# Patient Record
(demographics unavailable — no encounter records)

---

## 2025-04-11 NOTE — DEVELOPMENTAL MILESTONES

## 2025-04-11 NOTE — DISCUSSION/SUMMARY
[Normal Growth] : growth [Normal Development] : development [None] : No known medical problems [No Elimination Concerns] : elimination [No Feeding Concerns] : feeding [No Skin Concerns] : skin [Normal Sleep Pattern] : sleep [Family Support] : family support [Encouraging Literacy Activities] : encouraging literacy activities [Playing with Peers] : playing with peers [Promoting Physical Activity] : promoting physical activity [Safety] : safety [No Medications] : ~He/She~ is not on any medications [Parent/Guardian] : parent/guardian [] : The components of the vaccine(s) to be administered today are listed in the plan of care. The disease(s) for which the vaccine(s) are intended to prevent and the risks have been discussed with the caretaker.  The risks are also included in the appropriate vaccination information statements which have been provided to the patient's caregiver.  The caregiver has given consent to vaccinate. [FreeTextEntry1] : well 3 yr old male labs as ordered return in 1 yr/prn

## 2025-04-11 NOTE — HISTORY OF PRESENT ILLNESS
[Mother] : mother [2% ___ oz/d] : consumes [unfilled] oz of 2% cow's milk per day [Fruit] : fruit [Vegetables] : vegetables [Meat] : meat [Grains] : grains [Eggs] : eggs [Fish] : fish [Dairy] : dairy [Vitamin] : Patient takes vitamin daily [Normal] : Normal [In bed] : In bed [Sippy cup use] : Sippy cup use [Brushing teeth] : Brushing teeth [Yes] : Patient goes to dentist yearly [Toothpaste] : Primary Fluoride Source: Toothpaste [In nursery school] : In nursery school [Playtime (60 min/d)] : Playtime 60 min a day [< 2 hrs of screen time] : Less than 2 hrs of screen time [Appropiate parent-child communication] : Appropriate parent-child communication [Child given choices] : Child given choices [Parent has appropriate responses to behavior] : Parent has appropriate responses to behavior [No] : Not at  exposure [Water heater temperature set at <120 degrees F] : Water heater temperature set at <120 degrees F [Car seat in back seat] : Car seat in back seat [Smoke Detectors] : Smoke detectors [Supervised play near cars and streets] : Supervised play near cars and streets [Carbon Monoxide Detectors] : Carbon monoxide detectors [Exposure to electronic nicotine delivery system] : No exposure to electronic nicotine delivery system [Up to date] : Up to date [de-identified] : none [NO] : No

## 2025-04-11 NOTE — PHYSICAL EXAM

## 2025-06-24 NOTE — DEVELOPMENTAL MILESTONES
[Pull Self to Stand ___ Months] : Pull self to stand: [unfilled] months [Walk ___ Months] : Walk: [unfilled] months [Too Young] : too young

## 2025-06-25 NOTE — DATA REVIEWED
[de-identified] : Left foot AP/lateral/oblique x-rays ordered, done and independently reviewed today on 6/24/25: The lateral cuneiform bone is present.

## 2025-06-25 NOTE — END OF VISIT
[FreeTextEntry3] : I, Clement Beckett MD, I personally performed the services described in the documentation, reviewed the documentation recorded by the scribe in my presence and it accurately and completely records my words and actions

## 2025-06-25 NOTE — PHYSICAL EXAM
[Normal] : Patient is awake and alert and in no acute distress [Conjunctiva] : normal conjunctiva [Eyelids] : normal eyelids [Pupils] : pupils were equal and round [Ears] : normal ears [Nose] : normal nose [Lips] : normal lips [Rash] : no rash [FreeTextEntry1] : Pleasant and cooperative with exam, appropriate for age. Ambulates with A left intoeing gait with dynamic supination and positive metatarsus adductus noted.  Awake and alert appropriate for their age.  Skin: No rashes noted.  Eyes: Both conjunctiva, eyelids and pupils are present.  ENT:  Both ears, nose and lips are present. No nasal congestion.  Resp: No cough or wheezing noted.  Left foot/ankle: Positive mild flexible residual metatarsus adductus noted.  No skin breakdown or sores.  Neurologically intact with full sensation to palpation.  The foot has the ability to reduce to neutral position.  Achilles dorsiflexion to 15 degrees above neutral.  Ankle joint is stable with stress maneuvers.  There is no fixed supination noted but when walk and stand his ankle/foot is in supination. .5/5 muscle strength noted. 2+ pulses palpated in the extremity. Capillary refill less than 2 seconds in all digits.  When he walks, he is walking on the lateral border of the foot with dynamic supination

## 2025-06-25 NOTE — DATA REVIEWED
[de-identified] : Left foot AP/lateral/oblique x-rays ordered, done and independently reviewed today on 6/24/25: The lateral cuneiform bone is present.

## 2025-06-25 NOTE — HISTORY OF PRESENT ILLNESS
[FreeTextEntry1] : Nate is a 3.6 years old  male  with  left clubfoot  foot. 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 He was born at 38 weeks via vaginal delivery and was not breech. Presented for initial clubfoot evaluation and treatment. At the end of the visit, he was initiated into serial casting and  Later, we placed him in Wei bar and shoes for 23 hours/d.  patient met his gross motor  milestones, Today Nate,  Per mom is  not really wearing the brace during sleep and naps, he wakes up and removed them . He presents today for follow up. When he stand and walk he is on the lateral border of the foot, same if he is inside regular shoes.  Please refer to last note from previous treatment and further details.   Today, Nate is a healthy 3-year-old boy who has a history of left clubfoot.  He has not used any bracing for over a year she believes she was not supposed get new braces.  As per the last note in March 2024 she was instructed to get custom AFOs.  As per the mother his foot still looks the same.  He does not do any type of physical therapy.  He presents today for pediatric orthopedic follow-up examination.

## 2025-06-25 NOTE — ASSESSMENT
[FreeTextEntry1] : Nate is a 3.5 -old boy who has a history of a left clubfoot which also had a recurrence due to noncompliance with his Wei bars.  Today's assessment was performed with the assistance of the patient's parent as an independent historian as the patient's history is unreliable.  The radiographs obtained today were reviewed with both the parent and patient confirming the lateral cuneiform bone is present. The recommendation of the cell consist of going forward with surgical intervention , a left tibial anterior tendon transfer and possible a plantar fascial release.  Our surgical  will contact the family in regard to a surgical date and presurgical testing.  We had a thorough talk in regard to the diagnosis, prognosis and treatment modalities.  All questions and concerns were addressed today. There was a verbal understanding from the parents and patient.   JERONIMO Jha have acted as a scribe and documented the above information for Dr. Beckett.   This note was generated using Dragon medical dictation software. A reasonable effort has been made for proofreading its contents; however, typos may still remain. If there are any questions or points of clarification needed, please do not hesitate to contact my office.   The above documentation completed by the scribe is an accurate record of both my words and actions.   Dr. Beckett.

## 2025-06-25 NOTE — REVIEW OF SYSTEMS
[No Acute Changes] : No acute changes since previous visit [Change in Activity] : no change in activity [Fever Above 102] : no fever [Malaise] : no malaise [Rash] : no rash [Redness] : no redness [Nasal Stuffiness] : no nasal congestion [Sore Throat] : no sore throat [Heart Problems] : no heart problems [Tachypnea] : no tachypnea [Wheezing] : no wheezing [Cough] : no cough [Change in Appetite] : no change in appetite [Joint Pains] : no arthralgias [Joint Swelling] : no joint swelling [Back Pain] : ~T no back pain [Muscle Aches] : no muscle aches [Seizure] : no seizures [Sleep Disturbances] : ~T no sleep disturbances

## 2025-07-28 NOTE — HISTORY OF PRESENT ILLNESS
[Preoperative Visit] : for a medical evaluation prior to surgery [Good] : Good [Fever] : no fever [Chills] : no chills [Runny Nose] : no runny nose [Earache] : no earache [Headache] : no headache [Sore Throat] : no sore throat [Cough] : no cough [Appetite] : no decrease in appetite [Nausea] : no nausea [Vomiting] : no vomiting [Abdominal Pain] : no abdominal pain [Diarrhea] : no diarrhea [Easy Bruising] : no easy bruising [Rash] : no rash [Dysuria] : no dysuria [Urinary Frequency] : no urinary frequency [Prior Anesthesia] : Prior anesthesia [Prev Anesthesia Reaction] : no previous anesthesia reaction [Diabetes] : no diabetes [Pulmonary Disease] : no pulmonary disease [Renal Disease] : no renal disease [GI Disease] : no gastrointestinal disease [Sleep Apnea] : no sleep apnea [Transfusion Reaction] : no transfusion reaction [Impaired Immunity] : no impaired immunity [Frequent use of NSAIDs] : no use of NSAIDs [Anesthesia Reaction] : no anesthesia reaction [Clotting Disorder] : no clotting disorder [Bleeding Disorder] : no bleeding disorder [Sudden Death] : no sudden death [FreeTextEntry1] : Clubfoot foot surgery [FreeTextEntry2] : 07/23/2025

## 2025-07-28 NOTE — PHYSICAL EXAM
[General Appearance - Well Developed] : interactive [General Appearance - Well-Appearing] : well appearing [General Appearance - In No Acute Distress] : in no acute distress [Normal Appearance] : was normal in appearance [Neck Supple] : was supple [Enlarged Diffusely] : was not enlarged [Respiration, Rhythm And Depth] : normal respiratory rhythm and effort [Auscultation Breath Sounds / Voice Sounds] : clear bilateral breath sounds [Bowel Sounds] : normal bowel sounds [Abdomen Soft] : soft [Abdomen Tenderness] : non-tender [Abdominal Distention] : nondistended [] : no hepato-splenomegaly [Musculoskeletal Exam: Normal Movement Of All Extremities] : normal movements of all extremities [FreeTextEntry1] : LT CLUB FOOT [No Visual Abnormalities] : no visible abnormailities [Motor Tone] : normal muscle strength and tone [Generalized Lymph Node Enlargement] : no lymphadenopathy [Abnormal Color] : normal color and pigmentation [Skin Lesions 1] : no skin lesions were observed [Skin Turgor Decreased] : normal skin turgor [Normal] : normal texture and mobility [Initial Inspection: Infant Active And Alert] : active and alert [Penis Abnormality] : the penis was normal [Scrotum] : the scrotum was normal [Testes Cryptorchism] : both testicles were descended [Testes Mass (___cm)] : there were no testicular masses

## 2025-07-29 NOTE — POST OP
[___ Days Post Op] : post op day #[unfilled] [0] : no pain reported [Doing Well] : is doing well [Excellent Pain Control] : has excellent pain control [No Sign of Infection] : is showing no signs of infection [Chills] : no chills [Fever] : no fever [Nausea] : no nausea [Vomiting] : no vomiting [de-identified] : s/p left ankle tibialis anterior tendon transfer, DOS: 07/23/25 [de-identified] :  The patient is a pleasant 3-1/2 years old male, who has a history of left club foot.  He underwent serial casting as well as the Achilles tenotomy when he was a baby.  He was using, at the beginning for the  first few months here, the Ponseti bar and shoes.  He stopped using them when he come to see our office and he had residual varus of his foot and dynamic supination with his gait and he was indicated for a tibialis anterior tendon transfer.  Risks and benefits were discussed and they decided to proceed with surgery. Underwent the tendon transfer and was placed in a short leg cast.  Today, the patient reports he is overall doing well. Pain is well controlled. No numbness or tingling. Here today for further management.  [de-identified] : Left lower extremity: Seen in short leg cast. Remained on for examination.  Cast is clean, dry, and intact EHL/FHL/TA/GS intact BCR in all digits Moving digits freely Sensation grossly intact   [de-identified] : No imaging today. [de-identified] : 3 yo M, 6 days s/p left ankle tibialis anterior tendon transfer, DOS: 07/23/25 [de-identified] : Continue with short leg cast. Cast care discussed at length. Can weightbear as tolerated on the left lower extremity. Use Darco shoe for ambulation. Darco shoe was provided today. Clinical findings were discussed at length with the family today. Recommendation is to continue with cast for 2 more weeks and then will obtain left ankle xrays OOC All concerns were addressed. Family agreed to plan and have no further questions at this time.    IEsperanza PA-C, have acted as scribe and documented the above for Dr. Beckett for this encounter.

## 2025-07-29 NOTE — POST OP
[___ Days Post Op] : post op day #[unfilled] [0] : no pain reported [Doing Well] : is doing well [Excellent Pain Control] : has excellent pain control [No Sign of Infection] : is showing no signs of infection [Chills] : no chills [Fever] : no fever [Nausea] : no nausea [Vomiting] : no vomiting [de-identified] : s/p left ankle tibialis anterior tendon transfer, DOS: 07/23/25 [de-identified] :  The patient is a pleasant 3-1/2 years old male, who has a history of left club foot.  He underwent serial casting as well as the Achilles tenotomy when he was a baby.  He was using, at the beginning for the  first few months here, the Ponseti bar and shoes.  He stopped using them when he come to see our office and he had residual varus of his foot and dynamic supination with his gait and he was indicated for a tibialis anterior tendon transfer.  Risks and benefits were discussed and they decided to proceed with surgery. Underwent the tendon transfer and was placed in a short leg cast.  Today, the patient reports he is overall doing well. Pain is well controlled. No numbness or tingling. Here today for further management.  [de-identified] : Left lower extremity: Seen in short leg cast. Remained on for examination.  Cast is clean, dry, and intact EHL/FHL/TA/GS intact BCR in all digits Moving digits freely Sensation grossly intact   [de-identified] : No imaging today. [de-identified] : 3 yo M, 6 days s/p left ankle tibialis anterior tendon transfer, DOS: 07/23/25 [de-identified] : Continue with short leg cast. Cast care discussed at length. Can weightbear as tolerated on the left lower extremity. Use Darco shoe for ambulation. Darco shoe was provided today. Clinical findings were discussed at length with the family today. Recommendation is to continue with cast for 2 more weeks and then will obtain left ankle xrays OOC All concerns were addressed. Family agreed to plan and have no further questions at this time.    IEsperanza PA-C, have acted as scribe and documented the above for Dr. Beckett for this encounter.